# Patient Record
(demographics unavailable — no encounter records)

---

## 2024-11-30 NOTE — IMAGING
[de-identified] : PE L knee: +swelling, +lat JLT, neg nadine, ligs stable, able to SLR, ROM 0-90, NVI [Left] : left knee [There are no fractures, subluxations or dislocations. No significant abnormalities are seen] : There are no fractures, subluxations or dislocations. No significant abnormalities are seen [Degenerative change] : Degenerative change

## 2024-11-30 NOTE — HISTORY OF PRESENT ILLNESS
[9] : 9 [0] : 0 [Dull/Aching] : dull/aching [Localized] : localized [Intermittent] : intermittent [Walking] : walking [Full time] : Work status: full time [de-identified] : 66 y/o M with atraumatic L knee pain since yesterday after kneeling while shrink wrapping boat. denies buckling or locking. denies numbness/tingling. He has not tried any interventions.  denies DM. [] : Post Surgical Visit: no [FreeTextEntry1] : left knee [FreeTextEntry3] : 11/29/24 [FreeTextEntry5] : Patient was wrapping his boat, was kneeling on the ground up and down for 3o minutes, as he got up he felt some clicking in the left knee yesterday. pain got worse this morning. no prior hx

## 2024-11-30 NOTE — PROCEDURE
[Large Joint Injection] : Large joint injection [Left] : of the left [Knee] : knee [Pain] : pain [Inflammation] : inflammation [X-ray evidence of Osteoarthritis on this or prior visit] : x-ray evidence of Osteoarthritis on this or prior visit [Alcohol] : alcohol [Betadine] : betadine [Ethyl Chloride sprayed topically] : ethyl chloride sprayed topically [___ cc    1%] : Lidocaine ~Vcc of 1%  [___ cc    40mg] : Methylprednisolone (Depomedrol) ~Vcc of 40 mg  [Effusion] : effusion [] : Patient tolerated procedure well [Call if redness, pain or fever occur] : call if redness, pain or fever occur [Apply ice for 15min out of every hour for the next 12-24 hours as tolerated] : apply ice for 15 minutes out of every hour for the next 12-24 hours as tolerated [Patient was advised to rest the joint(s) for ____ days] : patient was advised to rest the joint(s) for [unfilled] days [Risks, benefits, alternatives discussed / Verbal consent obtained] : the risks benefits, and alternatives have been discussed, and verbal consent was obtained [de-identified] : 20cc [de-identified] : yellow, clear